# Patient Record
Sex: FEMALE | Race: WHITE | NOT HISPANIC OR LATINO | Employment: OTHER | URBAN - METROPOLITAN AREA
[De-identification: names, ages, dates, MRNs, and addresses within clinical notes are randomized per-mention and may not be internally consistent; named-entity substitution may affect disease eponyms.]

---

## 2024-08-09 ENCOUNTER — OFFICE VISIT (OUTPATIENT)
Age: 69
End: 2024-08-09
Payer: MEDICARE

## 2024-08-09 VITALS
BODY MASS INDEX: 34.52 KG/M2 | RESPIRATION RATE: 16 BRPM | DIASTOLIC BLOOD PRESSURE: 58 MMHG | SYSTOLIC BLOOD PRESSURE: 122 MMHG | TEMPERATURE: 97.4 F | OXYGEN SATURATION: 98 % | WEIGHT: 194.8 LBS | HEIGHT: 63 IN | HEART RATE: 98 BPM

## 2024-08-09 DIAGNOSIS — Z13.820 ENCOUNTER FOR OSTEOPOROSIS SCREENING IN ASYMPTOMATIC POSTMENOPAUSAL PATIENT: ICD-10-CM

## 2024-08-09 DIAGNOSIS — Z12.31 ENCOUNTER FOR SCREENING MAMMOGRAM FOR BREAST CANCER: ICD-10-CM

## 2024-08-09 DIAGNOSIS — E66.1 CLASS 1 DRUG-INDUCED OBESITY WITHOUT SERIOUS COMORBIDITY WITH BODY MASS INDEX (BMI) OF 34.0 TO 34.9 IN ADULT: ICD-10-CM

## 2024-08-09 DIAGNOSIS — M79.89 SWELLING OF BOTH LOWER EXTREMITIES: ICD-10-CM

## 2024-08-09 DIAGNOSIS — M25.562 CHRONIC PAIN OF LEFT KNEE: ICD-10-CM

## 2024-08-09 DIAGNOSIS — I87.8 VENOUS STASIS: ICD-10-CM

## 2024-08-09 DIAGNOSIS — E11.9 TYPE 2 DIABETES MELLITUS WITHOUT COMPLICATION, WITHOUT LONG-TERM CURRENT USE OF INSULIN (HCC): ICD-10-CM

## 2024-08-09 DIAGNOSIS — Z78.0 ENCOUNTER FOR OSTEOPOROSIS SCREENING IN ASYMPTOMATIC POSTMENOPAUSAL PATIENT: ICD-10-CM

## 2024-08-09 DIAGNOSIS — G89.29 CHRONIC PAIN OF LEFT KNEE: ICD-10-CM

## 2024-08-09 DIAGNOSIS — Z11.59 NEED FOR HEPATITIS C SCREENING TEST: ICD-10-CM

## 2024-08-09 DIAGNOSIS — Z00.00 MEDICARE ANNUAL WELLNESS VISIT, SUBSEQUENT: Primary | ICD-10-CM

## 2024-08-09 DIAGNOSIS — Z87.11 HISTORY OF BLEEDING PEPTIC ULCER: ICD-10-CM

## 2024-08-09 PROCEDURE — G0438 PPPS, INITIAL VISIT: HCPCS

## 2024-08-09 PROCEDURE — 99214 OFFICE O/P EST MOD 30 MIN: CPT

## 2024-08-09 RX ORDER — ATORVASTATIN CALCIUM 10 MG/1
10 TABLET, FILM COATED ORAL DAILY
COMMUNITY

## 2024-08-09 RX ORDER — LABETALOL 200 MG/1
200 TABLET, FILM COATED ORAL 2 TIMES DAILY
COMMUNITY

## 2024-08-09 RX ORDER — FUROSEMIDE 20 MG/1
20 TABLET ORAL DAILY
COMMUNITY

## 2024-08-09 RX ORDER — SPIRONOLACTONE 25 MG/1
25 TABLET ORAL DAILY
COMMUNITY

## 2024-08-09 RX ORDER — PANTOPRAZOLE SODIUM 40 MG/1
40 TABLET, DELAYED RELEASE ORAL DAILY
COMMUNITY

## 2024-08-09 RX ORDER — SUCRALFATE ORAL 1 G/10ML
1 SUSPENSION ORAL 4 TIMES DAILY
COMMUNITY

## 2024-08-09 RX ORDER — LISINOPRIL 40 MG/1
40 TABLET ORAL DAILY
COMMUNITY

## 2024-08-09 RX ORDER — DIPHENOXYLATE HYDROCHLORIDE AND ATROPINE SULFATE 2.5; .025 MG/1; MG/1
1 TABLET ORAL DAILY
COMMUNITY

## 2024-08-09 NOTE — ASSESSMENT & PLAN NOTE
Continue Lasix 20 mg daily and spironolactone 25 mg daily.  Plan to check labs to monitor potassium levels.

## 2024-08-09 NOTE — PROGRESS NOTES
"Ambulatory Visit  Name: Sol Desouza      : 1955      MRN: [id number]  Encounter Provider: Emily Santiago PA-C  Encounter Date: 2024   Encounter department: [facility] [place]    Assessment & Plan   1. Medicare annual wellness visit, subsequent  -     Lipid panel  -     CBC and differential  -     Comprehensive metabolic panel  2. Chronic pain of left knee  Assessment & Plan:  Recommend follow up with PT. Referral placed.   Orders:  -     Ambulatory Referral to Physical Therapy; Future  3. Type 2 diabetes mellitus without complication, without long-term current use of insulin (HCC)  Assessment & Plan:    No results found for: \"HGBA1C\"    Plan to check A1C. Continue metformin 500 mg twice daily.  Recommend healthy diet low in carbs, sugars, and fats.  Orders:  -     Hemoglobin A1C; Future  -     Albumin / creatinine urine ratio; Future  4. Class 1 drug-induced obesity without serious comorbidity with body mass index (BMI) of 34.0 to 34.9 in adult  -     Lipid panel  5. History of bleeding peptic ulcer  Assessment & Plan:  Continue protonix 40 mg daily. Return to the ER with any GI bleeding.   6. Swelling of both lower extremities  Assessment & Plan:  Continue Lasix 20 mg daily and spironolactone 25 mg daily.  Plan to check labs to monitor potassium levels.  7. Venous stasis  Assessment & Plan:  Recommend the use of compression stockings and elevate legs when sitting.  8. Encounter for osteoporosis screening in asymptomatic postmenopausal patient  -     DXA bone density spine hip and pelvis; Future; Expected date: 2024  9. Encounter for screening mammogram for breast cancer  -     Mammo screening bilateral w 3d & cad; Future; Expected date: 2024  10. Need for hepatitis C screening test  -     Hepatitis C Antibody; Future      Depression Screening and Follow-up Plan: Patient was screened for depression during today's encounter. They screened negative with a PHQ-2 score of " 0.    [place] Plan of Care: referral to physical therapy. Recommended assistive device to help with gait and balance. Home safety education provided.       Preventive health issues were discussed with patient, and age appropriate screening tests were ordered as noted in patient's After Visit Summary. Personalized health advice and appropriate referrals for health education or preventive services given if needed, as noted in patient's After Visit Summary.    History of Present Illness   {Disappearing Hyperlinks I Encounters * My Last Note * Since Last Visit * History :15164}  Sol is a 69-year-old female presenting to the office with her sister, and, to [facility] care and for Medicare annual wellness visit.  She was previously following with a provider in [place], but is currently staying with her sister.  She has a history of hypertension, stroke in 2006, swelling of her lower extremities, bilateral knee replacements, diabetes, peptic ulcers.  She was hospitalized in March of this year with an actively bleeding peptic ulcer.  She was placed on Carafate for a short time.  Carafate has since been stopped, but she is still on Protonix 40 mg daily.  She has not had any bleeding since.  She had an EGD done at that time.  She had her left knee replaced in February of this year and is still having some aches and pains of her left knee.  She went to physical therapy, but admits that she was not the most consistent with her exercises.  She would like to try physical therapy again.  She takes Excedrin as needed for her pain.  She states she had a mammogram done at [facility] in [facility] on July 1 of this year, it was normal.  She had a colonoscopy done a couple of years ago at [facility] as well.  She believes she was told to follow-up in 5 years.  She states she has a history of venous stasis with venous stasis dermatitis and ulcers in the past, which were treated. No current ulcers or skin wounds.   She is  on lasix and spironolactone for her history of lower extremity edema.              Patient Care Team:  Emily Santiago PA-C as PCP - General (Family Medicine)    Review of Systems   Constitutional:  Negative for chills and fever.   HENT:  Negative for ear pain and sore throat.    Eyes:  Negative for pain and visual disturbance.   Respiratory:  Negative for cough and shortness of breath.    Cardiovascular:  Negative for chest pain and palpitations.   Gastrointestinal:  Negative for abdominal pain and vomiting.   Genitourinary:  Negative for dysuria and hematuria.   Musculoskeletal:  Positive for arthralgias (left knee pain). Negative for back pain.   Skin:  Negative for color change and rash.   Neurological:  Negative for seizures and syncope.   All other systems reviewed and are negative.    Medical History Reviewed by provider this encounter:  Tobacco  Allergies  Meds  Problems  Med Hx  Surg Hx  Fam Hx       Annual Wellness Visit Questionnaire   Sol is here for her Subsequent Wellness visit.     Health Risk Assessment:   Patient rates overall health as fair. Patient feels that their physical health rating is slightly better. Patient is satisfied with their life. Eyesight was rated as same. Hearing was rated as same. Patient feels that their emotional and mental health rating is much worse. Patients states they are never, rarely angry. Patient states they are often unusually tired/fatigued. Pain experienced in the last 7 days has been some. Patient's pain rating has been 4/10. Patient states that she has experienced no weight loss or gain in last 6 months.     Depression Screening:   PHQ-2 Score: 0      Fall Risk Screening:   In the past year, patient has experienced: history of falling in past year    Number of falls: 1  Feels unsteady when standing or walking?: Yes    Worried about falling?: Yes      Urinary Incontinence Screening:   Patient has not leaked urine accidently in the last six months.      Home Safety:  Patient has trouble with stairs inside or outside of their home. Patient has working smoke alarms and has working carbon monoxide detector. Home safety hazards include: none.     Nutrition:   Current diet is Regular.     Medications:   Patient is not currently taking any over-the-counter supplements. Patient is able to manage medications.     Activities of Daily Living (ADLs)/Instrumental Activities of Daily Living (IADLs):   Walk and transfer into and out of bed and chair?: Yes  Dress and groom yourself?: Yes    Bathe or shower yourself?: Yes    Feed yourself? Yes  Do your laundry/housekeeping?: Yes  Manage your money, pay your bills and track your expenses?: Yes  Make your own meals?: Yes    Do your own shopping?: Yes    Previous Hospitalizations:   Any hospitalizations or ED visits within the last 12 months?: Yes    How many hospitalizations have you had in the last year?: 1-2    Advance Care Planning:   Living will: No      PREVENTIVE SCREENINGS        Diabetes Screening:     General: Screening Not Indicated and History Diabetes      Cervical Cancer Screening:    General: Screening Not Indicated      Lung Cancer Screening:     General: Screening Not Indicated    Screening, Brief Intervention, and Referral to Treatment (SBIRT)    Screening  Typical number of drinks in a day: 0  Typical number of drinks in a week: 0  Interpretation: Low risk drinking behavior.    Single Item Drug Screening:  How often have you used an illegal drug (including marijuana) or a prescription medication for non-medical reasons in the past year? never    Single Item Drug Screen Score: 0  Interpretation: Negative screen for possible drug use disorder    Social Determinants of Health     Food Insecurity: No Food Insecurity (8/9/2024)    Hunger Vital Sign    • Worried About Running Out of Food in the Last Year: Never true    • Ran Out of Food in the Last Year: Never true   Transportation Needs: No Transportation Needs  "(8/9/2024)    PRAPARE - Transportation    • Lack of Transportation (Medical): No    • Lack of Transportation (Non-Medical): No   Housing Stability: Low Risk  (8/9/2024)    Housing Stability Vital Sign    • Unable to Pay for Housing in the Last Year: No    • Number of Times Moved in the Last Year: 0    • Homeless in the Last Year: No   Utilities: Not At Risk (8/9/2024)    [facility]    • Threatened with loss of utilities: No     No results found.    Objective   {Disappearing Hyperlinks   Review Vitals * Enter New Vitals * Results Review * Labs * Imaging * Cardiology * Procedures * Lung Cancer Screening :60059}  /58 (BP Location: Right arm, Patient Position: Sitting, Cuff Size: Standard)   Pulse 98   Temp (!) 97.4 °F (36.3 °C) (Tympanic)   Resp 16   Ht 5' 3\" (1.6 m)   Wt 88.4 kg (194 lb 12.8 oz)   SpO2 98%   BMI 34.51 kg/m²     Physical Exam  Vitals and nursing note reviewed.   Constitutional:       General: She is not in acute distress.     Appearance: She is well-developed.   HENT:      Head: Normocephalic and atraumatic.      Right Ear: Tympanic membrane normal.      Left Ear: Tympanic membrane normal.      Nose: Nose normal.      Mouth/Throat:      Mouth: Mucous membranes are moist.      Pharynx: Oropharynx is clear. No oropharyngeal exudate.   Eyes:      Extraocular Movements: Extraocular movements intact.      Conjunctiva/sclera: Conjunctivae normal.   Cardiovascular:      Rate and Rhythm: Normal rate and regular rhythm.      Heart sounds: Normal heart sounds. No murmur heard.     No friction rub. No gallop.   Pulmonary:      Effort: Pulmonary effort is normal. No respiratory distress.      Breath sounds: Normal breath sounds. No wheezing, rhonchi or rales.   Abdominal:      Palpations: Abdomen is soft.      Tenderness: There is no abdominal tenderness.   Musculoskeletal:         General: No swelling.      Cervical back: Neck supple.   Skin:     General: Skin is warm and dry.      Capillary " Refill: Capillary refill takes less than 2 seconds.   Neurological:      General: No focal deficit present.      Mental Status: She is alert.   Psychiatric:         Mood and Affect: Mood normal.

## 2024-08-09 NOTE — PATIENT INSTRUCTIONS
Medicare Preventive Visit Patient Instructions  Thank you for completing your Welcome to Medicare Visit or Medicare Annual Wellness Visit today. Your next wellness visit will be due in one year (8/10/2025).  The screening/preventive services that you may require over the next 5-10 years are detailed below. Some tests may not apply to you based off risk factors and/or age. Screening tests ordered at today's visit but not completed yet may show as past due. Also, please note that scanned in results may not display below.  Preventive Screenings:  Service Recommendations Previous Testing/Comments   Colorectal Cancer Screening  * Colonoscopy    * Fecal Occult Blood Test (FOBT)/Fecal Immunochemical Test (FIT)  * Fecal DNA/Cologuard Test  * Flexible Sigmoidoscopy Age: 45-75 years old   Colonoscopy: every 10 years (may be performed more frequently if at higher risk)  OR  FOBT/FIT: every 1 year  OR  Cologuard: every 3 years  OR  Sigmoidoscopy: every 5 years  Screening may be recommended earlier than age 45 if at higher risk for colorectal cancer. Also, an individualized decision between you and your healthcare provider will decide whether screening between the ages of 76-85 would be appropriate. Colonoscopy: Not on file  FOBT/FIT: Not on file  Cologuard: Not on file  Sigmoidoscopy: Not on file          Breast Cancer Screening Age: 40+ years old  Frequency: every 1-2 years  Not required if history of left and right mastectomy Mammogram: Not on file        Cervical Cancer Screening Between the ages of 21-29, pap smear recommended once every 3 years.   Between the ages of 30-65, can perform pap smear with HPV co-testing every 5 years.   Recommendations may differ for women with a history of total hysterectomy, cervical cancer, or abnormal pap smears in past. Pap Smear: Not on file        Hepatitis C Screening Once for adults born between 1945 and 1965  More frequently in patients at high risk for Hepatitis C Hep C Antibody: Not  on file        Diabetes Screening 1-2 times per year if you're at risk for diabetes or have pre-diabetes Fasting glucose: No results in last 5 years (No results in last 5 years)  A1C: No results in last 5 years (No results in last 5 years)      Cholesterol Screening Once every 5 years if you don't have a lipid disorder. May order more often based on risk factors. Lipid panel: Not on file          Other Preventive Screenings Covered by Medicare:  Abdominal Aortic Aneurysm (AAA) Screening: covered once if your at risk. You're considered to be at risk if you have a family history of AAA.  Lung Cancer Screening: covers low dose CT scan once per year if you meet all of the following conditions: (1) Age 55-77; (2) No signs or symptoms of lung cancer; (3) Current smoker or have quit smoking within the last 15 years; (4) You have a tobacco smoking history of at least 20 pack years (packs per day multiplied by number of years you smoked); (5) You get a written order from a healthcare provider.  Glaucoma Screening: covered annually if you're considered high risk: (1) You have diabetes OR (2) Family history of glaucoma OR (3)  aged 50 and older OR (4)  American aged 65 and older  Osteoporosis Screening: covered every 2 years if you meet one of the following conditions: (1) You're estrogen deficient and at risk for osteoporosis based off medical history and other findings; (2) Have a vertebral abnormality; (3) On glucocorticoid therapy for more than 3 months; (4) Have primary hyperparathyroidism; (5) On osteoporosis medications and need to assess response to drug therapy.   Last bone density test (DXA Scan): Not on file.  HIV Screening: covered annually if you're between the age of 15-65. Also covered annually if you are younger than 15 and older than 65 with risk factors for HIV infection. For pregnant patients, it is covered up to 3 times per pregnancy.    Immunizations:  Immunization Recommendations    Influenza Vaccine Annual influenza vaccination during flu season is recommended for all persons aged >= 6 months who do not have contraindications   Pneumococcal Vaccine   * Pneumococcal conjugate vaccine = PCV13 (Prevnar 13), PCV15 (Vaxneuvance), PCV20 (Prevnar 20)  * Pneumococcal polysaccharide vaccine = PPSV23 (Pneumovax) Adults 19-63 yo with certain risk factors or if 65+ yo  If never received any pneumonia vaccine: recommend Prevnar 20 (PCV20)  Give PCV20 if previously received 1 dose of PCV13 or PPSV23   Hepatitis B Vaccine 3 dose series if at intermediate or high risk (ex: diabetes, end stage renal disease, liver disease)   Respiratory syncytial virus (RSV) Vaccine - COVERED BY MEDICARE PART D  * RSVPreF3 (Arexvy) CDC recommends that adults 60 years of age and older may receive a single dose of RSV vaccine using shared clinical decision-making (SCDM)   Tetanus (Td) Vaccine - COST NOT COVERED BY MEDICARE PART B Following completion of primary series, a booster dose should be given every 10 years to maintain immunity against tetanus. Td may also be given as tetanus wound prophylaxis.   Tdap Vaccine - COST NOT COVERED BY MEDICARE PART B Recommended at least once for all adults. For pregnant patients, recommended with each pregnancy.   Shingles Vaccine (Shingrix) - COST NOT COVERED BY MEDICARE PART B  2 shot series recommended in those 19 years and older who have or will have weakened immune systems or those 50 years and older     Health Maintenance Due:      Topic Date Due   • Hepatitis C Screening  Never done   • Breast Cancer Screening: Mammogram  Never done   • Colorectal Cancer Screening  Never done     Immunizations Due:      Topic Date Due   • Pneumococcal Vaccine: 65+ Years (1 of 1 - PCV) Never done   • COVID-19 Vaccine (1 - 2023-24 season) Never done   • Influenza Vaccine (1) 09/01/2024     Advance Directives   What are advance directives?  Advance directives are legal documents that state your wishes  and plans for medical care. These plans are made ahead of time in case you lose your ability to make decisions for yourself. Advance directives can apply to any medical decision, such as the treatments you want, and if you want to donate organs.   What are the types of advance directives?  There are many types of advance directives, and each state has rules about how to use them. You may choose a combination of any of the following:  Living will:  This is a written record of the treatment you want. You can also choose which treatments you do not want, which to limit, and which to stop at a certain time. This includes surgery, medicine, IV fluid, and tube feedings.   Durable power of  for healthcare (DPAHC):  This is a written record that states who you want to make healthcare choices for you when you are unable to make them for yourself. This person, called a proxy, is usually a family member or a friend. You may choose more than 1 proxy.  Do not resuscitate (DNR) order:  A DNR order is used in case your heart stops beating or you stop breathing. It is a request not to have certain forms of treatment, such as CPR. A DNR order may be included in other types of advance directives.  Medical directive:  This covers the care that you want if you are in a coma, near death, or unable to make decisions for yourself. You can list the treatments you want for each condition. Treatment may include pain medicine, surgery, blood transfusions, dialysis, IV or tube feedings, and a ventilator (breathing machine).  Values history:  This document has questions about your views, beliefs, and how you feel and think about life. This information can help others choose the care that you would choose.  Why are advance directives important?  An advance directive helps you control your care. Although spoken wishes may be used, it is better to have your wishes written down. Spoken wishes can be misunderstood, or not followed.  Treatments may be given even if you do not want them. An advance directive may make it easier for your family to make difficult choices about your care.   Weight Management   Why it is important to manage your weight:  Being overweight increases your risk of health conditions such as heart disease, high blood pressure, type 2 diabetes, and certain types of cancer. It can also increase your risk for osteoarthritis, sleep apnea, and other respiratory problems. Aim for a slow, steady weight loss. Even a small amount of weight loss can lower your risk of health problems.  How to lose weight safely:  A safe and healthy way to lose weight is to eat fewer calories and get regular exercise. You can lose up about 1 pound a week by decreasing the number of calories you eat by 500 calories each day.   Healthy meal plan for weight management:  A healthy meal plan includes a variety of foods, contains fewer calories, and helps you stay healthy. A healthy meal plan includes the following:  Eat whole-grain foods more often.  A healthy meal plan should contain fiber. Fiber is the part of grains, fruits, and vegetables that is not broken down by your body. Whole-grain foods are healthy and provide extra fiber in your diet. Some examples of whole-grain foods are whole-wheat breads and pastas, oatmeal, brown rice, and bulgur.  Eat a variety of vegetables every day.  Include dark, leafy greens such as spinach, kale, shelley greens, and mustard greens. Eat yellow and orange vegetables such as carrots, sweet potatoes, and winter squash.   Eat a variety of fruits every day.  Choose fresh or canned fruit (canned in its own juice or light syrup) instead of juice. Fruit juice has very little or no fiber.  Eat low-fat dairy foods.  Drink fat-free (skim) milk or 1% milk. Eat fat-free yogurt and low-fat cottage cheese. Try low-fat cheeses such as mozzarella and other reduced-fat cheeses.  Choose meat and other protein foods that are low in fat.   Choose beans or other legumes such as split peas or lentils. Choose fish, skinless poultry (chicken or turkey), or lean cuts of red meat (beef or pork). Before you cook meat or poultry, cut off any visible fat.   Use less fat and oil.  Try baking foods instead of frying them. Add less fat, such as margarine, sour cream, regular salad dressing and mayonnaise to foods. Eat fewer high-fat foods. Some examples of high-fat foods include french fries, doughnuts, ice cream, and cakes.  Eat fewer sweets.  Limit foods and drinks that are high in sugar. This includes candy, cookies, regular soda, and sweetened drinks.  Exercise:  Exercise at least 30 minutes per day on most days of the week. Some examples of exercise include walking, biking, dancing, and swimming. You can also fit in more physical activity by taking the stairs instead of the elevator or parking farther away from stores. Ask your healthcare provider about the best exercise plan for you.      © Copyright [employer] 2018 Information is for End User's use only and may not be sold, redistributed or otherwise used for commercial purposes. All illustrations and images included in CareNotes® are the copyrighted property of A.D.A.M., Inc. or [employer]

## 2024-08-09 NOTE — ASSESSMENT & PLAN NOTE
"  No results found for: \"HGBA1C\"    Plan to check A1C. Continue metformin 500 mg twice daily.  Recommend healthy diet low in carbs, sugars, and fats.  "

## 2024-08-12 ENCOUNTER — TELEPHONE (OUTPATIENT)
Dept: ADMINISTRATIVE | Facility: OTHER | Age: 69
End: 2024-08-12

## 2024-08-12 NOTE — LETTER
Procedure Request Form: Colonoscopy      Date Requested: 08/15/24  Patient: Solben Desouza     2nd Request  Patient : 1955   Referring Provider: Emily Santiago PA-C        Date of Procedure _________ report_____________________       The above patient has informed us that they have completed their   most recent Colonoscopy at your facility. Please complete   this form and attach all corresponding procedure reports/results.    Comments __________________________________________________________  ____________________________________________________________________  ____________________________________________________________________  ____________________________________________________________________    Facility Completing Procedure _________________________________________    Form Completed By (print name) _______________________________________      Signature __________________________________________________________      These reports are needed for  compliance.    Please fax this completed form and a copy of the procedure report to our office located at [address] [place] [address] as soon as possible to Fax [phone] attention Shahid: Phone [phone]    We thank you for your assistance in treating our mutual patient.

## 2024-08-12 NOTE — TELEPHONE ENCOUNTER
----- Message from Nohelia ZAMORANO sent at 8/9/2024  1:20 PM EDT -----  Regarding: Mammogram and Colonoscopy  08/09/24 1:20 PM    Hello, our patient Sol Desouza has had CRC: Colonoscopy and Mammogram completed/performed. Please assist in updating the patient chart by making an External outreach to [facility] facility located in [facility]. The date of service is July 1st 2024.    Thank you,  Nohelia Morales   PRIMARY CARE [place]

## 2024-08-12 NOTE — TELEPHONE ENCOUNTER
Upon review of the In Basket request and the patient's chart, initial outreach has been made via fax to facility. Please see Contacts section for details.     Thank you  Shahid Otero MA

## 2024-08-12 NOTE — LETTER
Procedure Request Form: Colonoscopy and Mammogram      Date Requested: 24  Patient: Sol Desouza  Patient : 1955   Referring Provider: Emily Santiago PA-C        Date of Procedure _______ reports_______________________       The above patient has informed us that they have completed their   most recent Colonoscopy and Mammogram at your facility. Please complete   this form and attach all corresponding procedure reports/results.    Comments __________________________________________________________  ____________________________________________________________________  ____________________________________________________________________  ____________________________________________________________________    Facility Completing Procedure _________________________________________    Form Completed By (print name) _______________________________________      Signature __________________________________________________________      These reports are needed for  compliance.    Please fax this completed form and a copy of the procedure report to our office located at [address] [place] [address] as soon as possible to Fax [phone] attention Shahid: Phone [phone]    We thank you for your assistance in treating our mutual patient.

## 2024-08-14 ENCOUNTER — EVALUATION (OUTPATIENT)
Dept: PHYSICAL THERAPY | Facility: CLINIC | Age: 69
End: 2024-08-14
Payer: MEDICARE

## 2024-08-14 DIAGNOSIS — G89.29 CHRONIC PAIN OF LEFT KNEE: ICD-10-CM

## 2024-08-14 DIAGNOSIS — M25.562 CHRONIC PAIN OF LEFT KNEE: ICD-10-CM

## 2024-08-14 PROCEDURE — 97110 THERAPEUTIC EXERCISES: CPT | Performed by: PHYSICAL THERAPIST

## 2024-08-14 PROCEDURE — 97161 PT EVAL LOW COMPLEX 20 MIN: CPT | Performed by: PHYSICAL THERAPIST

## 2024-08-14 NOTE — LETTER
2024    Emily Santiago PA-C  [address] [facility] [address]    Patient: Sol Desouza   YOB: 1955   Date of Visit: 2024     Encounter Diagnosis     ICD-10-CM    1. Chronic pain of left knee  M25.562 Ambulatory Referral to Physical Therapy    G89.29           Dear Dr. Santiago:    Thank you for your recent referral of Sol Desouza. Please review the attached evaluation summary from Sol's recent visit.     Please verify that you agree with the plan of care by signing the attached order.     If you have any questions or concerns, please do not hesitate to call.     I sincerely appreciate the opportunity to share in the care of one of your patients and hope to have another opportunity to work with you in the near future.       Sincerely,    Marshall Samuel, PT      Referring Provider:      I certify that I have read the below Plan of Care and certify the need for these services furnished under this plan of treatment while under my care.                    Emily Santiago PA-C  [address] [facility] [address]  Via In Basket          PT Evaluation     Today's date: 2024  Patient name: Sol Desouza  : 1955  MRN: [id number]  Referring provider: Emily Santiago PA-C  Dx:   Encounter Diagnosis     ICD-10-CM    1. Chronic pain of left knee  M25.562 Ambulatory Referral to Physical Therapy    G89.29                      Assessment  Impairments: abnormal gait, abnormal muscle tone, abnormal or restricted ROM, activity intolerance, impaired balance, impaired physical strength, lacks appropriate home exercise program and pain with function    Assessment details: Sol Desouza is a 69 y.o. female presenting as an outpatient to [facility] w/ L knee pain s/p L TKA in 2024. In addition to pain, pt presents w/ impaired gait, decreased ROM, decreased hamstring flexibility, impaired posture, and decreased strength  significantly limiting functional ability.  Pt will benefit from skilled PT services to address the above deficits in order to max function to allow pt to achieve goals in PT.  Thank you for the referral of this pt.     Barriers to therapy: Pt will be moving to [place] most likely w/in the next 1.5 to 2 weeks. She plans to continue in [place] w/ tx  Understanding of Dx/Px/POC: good     Prognosis: good    Goals  ST.Pain decreased by 25% in 4 weeks.  2. Knee extension ROM improved to 0 degrees in 4 weeks.   3. Strength increased by 1/2 to 1 muscle grade in all deficient muscle groups in 4 weeks.  4. Pt will be independent w/ initial HEP in 1-2 visits.     LT. Decrease pain to 1-2/10 at worst by d/c.  2. Increase ROM to WFL for all deficient movements by d/c.  3. Strength increased to 5 for all deficient muscle groups by d/c.  4. IADL performance increased to max function by d/c.  5. Recreational performance increased to max function by d/c.  6. Ambulation/stair climbing improved to max level of function by d/c.   7. Pt will be independent w/ comprehensive HEP by d/c.     Plan  Planned modality interventions: cryotherapy, TENS and thermotherapy: hydrocollator packs  Other planned modality interventions: other modalities PRN    Planned therapy interventions: ADL retraining, IADL retraining, flexibility, functional ROM exercises, graded exercise, home exercise program, manual therapy, joint mobilization, neuromuscular re-education, patient education, postural training, strengthening, therapeutic exercise, therapeutic activities, work reintegration, balance and transfer training  Other planned therapy interventions: other interventions PRN    Frequency: 1-2x/week.  Duration in weeks: 4  Plan of Care beginning date: 2024  Plan of Care expiration date: 2024  Treatment plan discussed with: patient        Subjective Evaluation    History of Present Illness  Mechanism of injury: Pt reports to IE w/  c/o L chronic knee pain s/p L TKA on 2024. She reports R TKA was approximately 6 months prior.     She reports that she had skilled PT tx x approx 6 weeks following surgery. She reports that she did well at the time, but she did not continue w/ HEP since d/c due to personal issues. She reports that she was using SPC, but has reverted back to using SPC and RW more frequently.     Pt denies any numbness/parasthesias in LLE.   Patient Goals  Patient goals for therapy: decreased pain  Patient goal: Learn HEP  Pain  Current pain ratin  At worst pain ratin  Location: L knee  Relieving factors: rest  Exacerbated by: stair climbing (STS fashion), ambulation > 10 minutes, transition from STS; no pain sleeping at night.    Social Support  Steps to enter house: yes (5 steps w/ rail (2 hands on rail); no stairs to enter home moving into)  Stairs in house: yes (5 BAMBI, but no stairs where moving)   Lives with: right now living w/ sister and moving to apartment alone near daughter.    Employment status: not working (retired)        Objective     Active Range of Motion   Left Knee   Flexion: 122 degrees   Extension: -2 degrees with pain    Passive Range of Motion   Left Knee   Flexion: 128 degrees with pain  Extension: 0 degrees with pain    Mobility   Patellar Mobility:   Left Knee   WFL: medial, lateral, superior and inferior.     Strength/Myotome Testing     Left Knee   Flexion: 4  Extension: 3+ (*pain)  Quadriceps contraction: good    Additional Strength Details  Hip Strength (L):   Flexion (assessed in supine via SLR): 4+  Abduction: (assessed in s/l): 4-  ER (assessed in s/l): 4-    Tests     Additional Tests Details  Gait: Pt ambulates w/ antalgic gait using RW w/ increased fwd trunk lean    Palpation: Hypertonicity/tenderness w/ palpation to ; tenderness w palpation inferior and lateral to patella    Hamstring Length (assessed via popliteal angle method- L): 28 degrees        Daily Treatment  Diary    Precautions: DMII; Hx of b/l TKA (L done in February 2024); HTN-takes meds  Co- Morbidities:   Patient Active Problem List   Diagnosis   • Type 2 diabetes mellitus without complication, without long-term current use of insulin (HCC)   • Chronic pain of left knee   • History of bleeding peptic ulcer   • Venous stasis   • Swelling of both lower extremities         Manual  8/14                                            L Patellar PROM                                                                                                 Exercise Diary 8/14                     THEREX             Pt Ed RB- HEP instruct/handout, education on walker height/adjustment            Recumbent Bike                       Gastrocs Stretch                       HR/TR                                                 Active HS stretch w/ ankle pumps                                                    NEURO RE-ED             SLR flexion, abduction             clamshells                       Bridges                       Sidestepping             Step ups/downs                                                                 THERAPEUTIC ACTIVITY             STS                                                                          Gait Training                       RW--> cane                       stairs                         Modalities                      CP PRN

## 2024-08-14 NOTE — PROGRESS NOTES
PT Evaluation     Today's date: 2024  Patient name: Sol Desouza  : 1955  MRN: [id number]  Referring provider: Emily Santiago PA-C  Dx:   Encounter Diagnosis     ICD-10-CM    1. Chronic pain of left knee  M25.562 Ambulatory Referral to Physical Therapy    G89.29                      Assessment  Impairments: abnormal gait, abnormal muscle tone, abnormal or restricted ROM, activity intolerance, impaired balance, impaired physical strength, lacks appropriate home exercise program and pain with function    Assessment details: Sol Desouza is a 69 y.o. female presenting as an outpatient to [facility] PT w/ L knee pain s/p L TKA in 2024. In addition to pain, pt presents w/ impaired gait, decreased ROM, decreased hamstring flexibility, impaired posture, and decreased strength significantly limiting functional ability.  Pt will benefit from skilled PT services to address the above deficits in order to max function to allow pt to achieve goals in PT.  Thank you for the referral of this pt.     Barriers to therapy: Pt will be moving to [place] most likely w/in the next 1.5 to 2 weeks. She plans to continue in [place] w/ tx  Understanding of Dx/Px/POC: good     Prognosis: good    Goals  ST.Pain decreased by 25% in 4 weeks.  2. Knee extension ROM improved to 0 degrees in 4 weeks.   3. Strength increased by 1/2 to 1 muscle grade in all deficient muscle groups in 4 weeks.  4. Pt will be independent w/ initial HEP in 1-2 visits.     LT. Decrease pain to 1-2/10 at worst by d/c.  2. Increase ROM to WFL for all deficient movements by d/c.  3. Strength increased to 5 for all deficient muscle groups by d/c.  4. IADL performance increased to max function by d/c.  5. Recreational performance increased to max function by d/c.  6. Ambulation/stair climbing improved to max level of function by d/c.   7. Pt will be independent w/ comprehensive HEP by d/c.     Plan  Planned modality  interventions: cryotherapy, TENS and thermotherapy: hydrocollator packs  Other planned modality interventions: other modalities PRN    Planned therapy interventions: ADL retraining, IADL retraining, flexibility, functional ROM exercises, graded exercise, home exercise program, manual therapy, joint mobilization, neuromuscular re-education, patient education, postural training, strengthening, therapeutic exercise, therapeutic activities, work reintegration, balance and transfer training  Other planned therapy interventions: other interventions PRN    Frequency: 1-2x/week.  Duration in weeks: 4  Plan of Care beginning date: 2024  Plan of Care expiration date: 2024  Treatment plan discussed with: patient        Subjective Evaluation    History of Present Illness  Mechanism of injury: Pt reports to IE w/ c/o L chronic knee pain s/p L TKA on 2024. She reports R TKA was approximately 6 months prior.     She reports that she had skilled PT tx x approx 6 weeks following surgery. She reports that she did well at the time, but she did not continue w/ HEP since d/c due to personal issues. She reports that she was using SPC, but has reverted back to using SPC and RW more frequently.     Pt denies any numbness/parasthesias in LLE.   Patient Goals  Patient goals for therapy: decreased pain  Patient goal: Learn HEP  Pain  Current pain ratin  At worst pain ratin  Location: L knee  Relieving factors: rest  Exacerbated by: stair climbing (STS fashion), ambulation > 10 minutes, transition from STS; no pain sleeping at night.    Social Support  Steps to enter house: yes (5 steps w/ rail (2 hands on rail); no stairs to enter home moving into)  Stairs in house: yes (5 BAMBI, but no stairs where moving)   Lives with: right now living w/ sister and moving to apartment alone near daughter.    Employment status: not working (retired)        Objective     Active Range of Motion   Left Knee   Flexion: 122 degrees    Extension: -2 degrees with pain    Passive Range of Motion   Left Knee   Flexion: 128 degrees with pain  Extension: 0 degrees with pain    Mobility   Patellar Mobility:   Left Knee   WFL: medial, lateral, superior and inferior.     Strength/Myotome Testing     Left Knee   Flexion: 4  Extension: 3+ (*pain)  Quadriceps contraction: good    Additional Strength Details  Hip Strength (L):   Flexion (assessed in supine via SLR): 4+  Abduction: (assessed in s/l): 4-  ER (assessed in s/l): 4-    Tests     Additional Tests Details  Gait: Pt ambulates w/ antalgic gait using RW w/ increased fwd trunk lean    Palpation: Hypertonicity/tenderness w/ palpation to ; tenderness w palpation inferior and lateral to patella    Hamstring Length (assessed via popliteal angle method- L): 28 degrees        Daily Treatment Diary    Precautions: DMII; Hx of b/l TKA (L done in February 2024); HTN-takes meds  Co- Morbidities:   Patient Active Problem List   Diagnosis    Type 2 diabetes mellitus without complication, without long-term current use of insulin (HCC)    Chronic pain of left knee    History of bleeding peptic ulcer    Venous stasis    Swelling of both lower extremities         Manual  8/14                                            L Patellar PROM                                                                                                 Exercise Diary 8/14                     THEREX             Pt Ed RB- HEP instruct/handout, education on walker height/adjustment            Recumbent Bike                       Gastrocs Stretch                       HR/TR                                                 Active HS stretch w/ ankle pumps                                                    NEURO RE-ED             SLR flexion, abduction             clamshells                       Bridges                       Sidestepping             Step ups/downs                                                                 THERAPEUTIC ACTIVITY              STS                                                                          Gait Training                       RW--> cane                       stairs                         Modalities                      CP PRN

## 2024-08-19 NOTE — TELEPHONE ENCOUNTER
As a final attempt, a third outreach has been made via telephone call to facility. Please see Contacts section for details. This encounter will be closed and completed by end of day. Should we receive the requested information because of previous outreach attempts, the requested patient's chart will be updated appropriately.     Thank you  Shahid Otero MA

## 2024-08-20 ENCOUNTER — OFFICE VISIT (OUTPATIENT)
Dept: PHYSICAL THERAPY | Facility: CLINIC | Age: 69
End: 2024-08-20
Payer: MEDICARE

## 2024-08-20 DIAGNOSIS — G89.29 CHRONIC PAIN OF LEFT KNEE: Primary | ICD-10-CM

## 2024-08-20 DIAGNOSIS — M25.562 CHRONIC PAIN OF LEFT KNEE: Primary | ICD-10-CM

## 2024-08-20 PROCEDURE — 97112 NEUROMUSCULAR REEDUCATION: CPT

## 2024-08-20 PROCEDURE — 97140 MANUAL THERAPY 1/> REGIONS: CPT

## 2024-08-20 PROCEDURE — 97110 THERAPEUTIC EXERCISES: CPT

## 2024-08-20 NOTE — PROGRESS NOTES
"Daily Note     Today's date: 2024  Patient name: Sol Desouza  : 1955  MRN: [id number]  Referring provider: Emily Santiago PA-C  Dx:   Encounter Diagnosis     ICD-10-CM    1. Chronic pain of left knee  M25.562     G89.29                      Subjective: pt reports having more pain in the L knee today.       Objective: See treatment diary below      Assessment: Tolerated treatment well. Patient would benefit from continued PT. Started gentle strengthening exercises, assisted with SLR. Stiffness with patellar PROM.       Plan: Continue per plan of care.      Daily Treatment Diary    Precautions: DMII; Hx of b/l TKA (L done in 2024); HTN-takes meds  Co- Morbidities:   Patient Active Problem List   Diagnosis    Type 2 diabetes mellitus without complication, without long-term current use of insulin (HCC)    Chronic pain of left knee    History of bleeding peptic ulcer    Venous stasis    Swelling of both lower extremities         Manual                                            L Patellar PROM                                                                                                 Exercise Diary                    THEREX             Pt Ed RB- HEP instruct/handout, education on walker height/adjustment            Recumbent Bike                       Gastrocs Stretch    w/ strap 30\" x3                   HR/TR    2x10                                             Active HS stretch w/ ankle pumps  10 pumps 3 rounds                                                  NEURO RE-ED             SLR flexion, abduction  10x ea           clamshells                       Bridges    10x3\"                   Sidestepping             Step ups/downs             Quad sets  5\" x10                                                  THERAPEUTIC ACTIVITY             STS                                                                          Gait Training                       RW--> cane     "                   stairs                         Modalities                      CP PRN

## 2024-08-22 ENCOUNTER — OFFICE VISIT (OUTPATIENT)
Dept: PHYSICAL THERAPY | Facility: CLINIC | Age: 69
End: 2024-08-22
Payer: MEDICARE

## 2024-08-22 DIAGNOSIS — M25.562 CHRONIC PAIN OF LEFT KNEE: Primary | ICD-10-CM

## 2024-08-22 DIAGNOSIS — G89.29 CHRONIC PAIN OF LEFT KNEE: Primary | ICD-10-CM

## 2024-08-22 PROCEDURE — 97110 THERAPEUTIC EXERCISES: CPT

## 2024-08-22 PROCEDURE — 97112 NEUROMUSCULAR REEDUCATION: CPT

## 2024-08-22 NOTE — PROGRESS NOTES
"Daily Note     Today's date: 2024  Patient name: Sol Desouza  : 1955  MRN: [id number]  Referring provider: Emily Santiago PA-C  Dx:   Encounter Diagnosis     ICD-10-CM    1. Chronic pain of left knee  M25.562     G89.29                      Subjective: pt reports feeling stiff today. Stated she felt fine post previous session.       Objective: See treatment diary below      Assessment: Tolerated treatment well. Patient would benefit from continued PT. Continued w/ current POC as listed below w/ good tolerance. Added clamshells and sidestepping. Educated pt about healing time and importance of quad/hamstring strength to help w/ overall pain reduction and function.       Plan: Continue per plan of care.      Daily Treatment Diary    Precautions: DMII; Hx of b/l TKA (L done in 2024); HTN-takes meds  Co- Morbidities:   Patient Active Problem List   Diagnosis    Type 2 diabetes mellitus without complication, without long-term current use of insulin (HCC)    Chronic pain of left knee    History of bleeding peptic ulcer    Venous stasis    Swelling of both lower extremities         Manual                                          L Patellar PROM    [facility]                                                                                           Exercise Diary                  THEREX             Pt Ed RB- HEP instruct/handout, education on walker height/adjustment [facility]          Recumbent Bike                       Gastrocs Stretch    w/ strap 30\" x3  w/ strap 30\" x3                 HR/TR    2x10                   Ball squeeze   2x10x5\"          Hip abd HL   Rtb 2x10x5\"          Active HS stretch w/ ankle pumps  10 pumps 3 rounds 10 pumps 3 rounds                                                 NEURO RE-ED             SLR flexion, abduction  10x ea 10x ea          clamshells      10x3\"                 Bridges    10x3\"  10x3\"                 Sidestepping   Rtb 2 laps " "         Step ups/downs             Quad sets  5\" x10 5\" x10                                                 THERAPEUTIC ACTIVITY             STS                                                                          Gait Training                       RW--> cane                       stairs                         Modalities                      CP PRN                                                                             "

## 2024-08-26 ENCOUNTER — OFFICE VISIT (OUTPATIENT)
Dept: PHYSICAL THERAPY | Facility: CLINIC | Age: 69
End: 2024-08-26
Payer: MEDICARE

## 2024-08-26 DIAGNOSIS — M25.562 CHRONIC PAIN OF LEFT KNEE: Primary | ICD-10-CM

## 2024-08-26 DIAGNOSIS — G89.29 CHRONIC PAIN OF LEFT KNEE: Primary | ICD-10-CM

## 2024-08-26 PROCEDURE — 97112 NEUROMUSCULAR REEDUCATION: CPT | Performed by: PHYSICAL THERAPIST

## 2024-08-26 PROCEDURE — 97110 THERAPEUTIC EXERCISES: CPT | Performed by: PHYSICAL THERAPIST

## 2024-08-26 PROCEDURE — 97116 GAIT TRAINING THERAPY: CPT | Performed by: PHYSICAL THERAPIST

## 2024-08-26 NOTE — PROGRESS NOTES
"Daily Note     Today's date: 2024  Patient name: Sol Desouza  : 1955  MRN: [id number]  Referring provider: Emily Santiago PA-C  Dx:   Encounter Diagnosis     ICD-10-CM    1. Chronic pain of left knee  M25.562     G89.29                      Subjective: Pt w/o any major changes to report upon arrival to tx session. Pt is planning to move next week.       Objective: See treatment diary below      Assessment: Progressed ex as below. Added gait training w/ SPC which pt had difficulty coordinating and required frequent cueing to decrease step length.  Plan to continue practicing steps NV as this was difficult for pt to perform relying on LLE>       Plan: Continue per plan of care.      Daily Treatment Diary    Precautions: DMII; Hx of b/l TKA (L done in 2024); HTN-takes meds  Co- Morbidities:   Patient Active Problem List   Diagnosis    Type 2 diabetes mellitus without complication, without long-term current use of insulin (HCC)    Chronic pain of left knee    History of bleeding peptic ulcer    Venous stasis    Swelling of both lower extremities         Manual                                        L Patellar PROM    [facility]  NP- resume nv                                                                                         Exercise Diary              THEREX            Pt Ed RB- HEP instruct/handout, education on walker height/adjustment [facility]         Recumbent Bike        5'             Gastrocs Stretch    w/ strap 30\" x3  w/ strap 30\" x3  w strap 30\"x3             HR/TR    2x10                 Ball squeeze   2x10x5\"         Hip abd HL   Rtb 2x10x5\"         Active HS stretch w/ ankle pumps  10 pumps 3 rounds 10 pumps 3 rounds         LAQ    5\" x 10; 2# 5\" x 10        HS curls                        NEURO RE-ED            SLR flexion, abduction  10x ea 10x ea 2x10x ea/2x10 ea        clamshells      10x3\"  10x3\"             Bridges    10x3\"  10x3\"  " "10x 3\"             Sidestepping   Rtb 2 laps         Step ups/downs    6\" 4x up/4x/ down STS fashion w/ u/l rail and cane        Quad sets  5\" x10 5\" x10 5\" x 10                                            THERAPEUTIC ACTIVITY            STS    NV                                                                Gait Training                     RW--> cane        SPC 8 min             stairs                       Modalities                      CP PRN                                                                             "

## 2024-08-28 ENCOUNTER — OFFICE VISIT (OUTPATIENT)
Dept: PHYSICAL THERAPY | Facility: CLINIC | Age: 69
End: 2024-08-28
Payer: MEDICARE

## 2024-08-28 DIAGNOSIS — G89.29 CHRONIC PAIN OF LEFT KNEE: Primary | ICD-10-CM

## 2024-08-28 DIAGNOSIS — M25.562 CHRONIC PAIN OF LEFT KNEE: Primary | ICD-10-CM

## 2024-08-28 PROCEDURE — 97110 THERAPEUTIC EXERCISES: CPT | Performed by: PHYSICAL THERAPIST

## 2024-08-28 PROCEDURE — 97112 NEUROMUSCULAR REEDUCATION: CPT | Performed by: PHYSICAL THERAPIST

## 2024-08-28 NOTE — TELEPHONE ENCOUNTER
Upon review of the In Basket request we have found as a result of outreach that patient did not have the requested item(s) completed. Colonoscopy    Any additional questions or concerns should be emailed to the Practice Liaisons via the appropriate education email address, please do not reply via In Basket.    Thank you  Shahid Otero MA   [facility]

## 2024-08-28 NOTE — TELEPHONE ENCOUNTER
Upon review of the In Basket request we were able to locate, review, and update the patient chart as requested for Mammogram.    Any additional questions or concerns should be emailed to the Practice Liaisons via the appropriate education email address, please do not reply via In Basket.    Thank you  Shahid Otero MA   [facility]

## 2024-08-28 NOTE — PROGRESS NOTES
"Daily Note     Today's date: 2024  Patient name: Sol Desouza  : 1955  MRN: [id number]  Referring provider: Emily Santiago PA-C  Dx:   Encounter Diagnosis     ICD-10-CM    1. Chronic pain of left knee  M25.562     G89.29                      Subjective: Pt  reports being very sore/painful after LV.  Today is pt's last day as she is moving to [place].       Objective: See treatment diary below      Assessment: Tx session tolerated very well today.  Decreased step height to 4\" for step ups due to significant pain after LV. Pt tolerated well.  Also added STS w/ very good tolerance. Provided pt w/ updated HEP and instructed pt to continue ambulating w/ RW vs SPC until more steady. Pt encouraged to contact clinic w/ any questions/concerns post d/c.       Plan: D/c from skilled PT services; pt encouraged to continue skilled PT tx at upon move.      Daily Treatment Diary    Precautions: DMII; Hx of b/l TKA (L done in 2024); HTN-takes meds  Co- Morbidities:   Patient Active Problem List   Diagnosis    Type 2 diabetes mellitus without complication, without long-term current use of insulin (HCC)    Chronic pain of left knee    History of bleeding peptic ulcer    Venous stasis    Swelling of both lower extremities         Manual                                      L Patellar PROM    [facility]  NP- resume nv  RB                                                                                       Exercise Diary            THEREX            Pt Ed RB- HEP instruct/handout, education on walker height/adjustment [facility]         Recumbent Bike        5'  5'           Gastrocs Stretch    w/ strap 30\" x3  w/ strap 30\" x3  w strap 30\"x3  w/ strap 30\"x3           HR/TR    2x10      15x           Ball squeeze   2x10x5\"         Hip abd HL   Rtb 2x10x5\"         Active HS stretch w/ ankle pumps  10 pumps 3 rounds 10 pumps 3 rounds         LAQ    5\" x 10; 2# 5\" x 10 " "2# 5\" 2x10       HS curls                        NEURO RE-ED            SLR flexion, abduction  10x ea 10x ea 2x10x ea/2x10 ea 2x10ea/2x10 ea       clamshells      10x3\"  10x3\"  10x 3\"           Bridges    10x3\"  10x3\"  10x 3\"  10x 3\"           Sidestepping   Rtb 2 laps         Step ups/downs    6\" 4x up/4x/ down STS fashion w/ u/l rail and cane 4\" 10x w/ u/l UE support       Quad sets  5\" x10 5\" x10 5\" x 10 5\"x10                                           THERAPEUTIC ACTIVITY            STS    NV 10x                                                               Gait Training                     RW--> cane        SPC 8 min             stairs                       Modalities                      CP PRN                                                                             "